# Patient Record
(demographics unavailable — no encounter records)

---

## 2024-10-31 NOTE — HISTORY OF PRESENT ILLNESS
[Other Location: e.g. School (Enter Location, City,State)___] : at [unfilled], at the time of the visit. [Medical Office: ([facility])___] : at the medical office located in  [Verbal consent obtained from patient] : the patient, [unfilled] [FreeTextEntry1] : Mr Panchito Ramirez is a 46-year-old  who presents for treatment of  low testosterone levels He had T levels assessed by PCP because of concerns about weight gain and lack of muscle mass despite regular exercise and healthy habits.         He denies significant sexual dysfunction or energy level issues, but notes some decrease in libido and longer time to achieve erection.     - Previous testosterone levels were 132 in February (afternoon reading) and 212 in July with a free testosterone of 3 (low).     - Denies significant medical problems, but reports an episode of shortness of breath about 1.5 years ago, which was evaluated and found to be due to a hiatal hernia.      - Denies smoking or excessive alcohol use.   - Past Medical History: Anxiety, hiatal hernia   - Past Surgical History: Shoulder, thumb, and finger surgeries   - Family History: No family history of prostate, kidney, or bladder cancer mentioned.   - Social History:     -  for 16 years     - Two children (son aged 16, daughter aged 13)     - Wife had tubal ligation after second child     -  by occupation     - Denies smoking or excessive alcohol use     - Exercises regularly (weightlifting 2-3 times per week)     - Follows a healthy diet     Objective:   - Diagnostic Results:     - Testosterone level: 132 (afternoon reading in February), 212 with free testosterone of 3 (low) in July     - Sex hormone binding globulin: 16.5 (normal)     - Luteinizing hormone: Normal     - Prolactin: 13.5 (normal)     - Previous cardiac evaluation (ultrasound, CT scan with contrast) showed no plaque and normal heart function   - Vital Signs: Not mentioned.   - Physical Examination (PE): Not discussed in detail, but provider plans to examine the patient.   Assessment:   - Summary: The patient is a 46-year-old male with documented low testosterone levels on two occasions, with associated symptoms of weight gain and difficulty gaining muscle mass despite regular exercise and healthy habits. The low testosterone levels appear to be idiopathic, as the hormones involved in testosterone production are normal.   - Problems:     - Low testosterone levels   - Differential Diagnosis:     - Idiopathic low testosterone levels     - Possible age-related decline in testosterone production   Plan:   - Summary: The plan is to obtain a repeat morning testosterone level and a bone density scan to further evaluate the need for testosterone replacement therapy. If the testosterone level is confirmed to be low and the patient is symptomatic, different options for testosterone replacement will be discussed, including potential risks and benefits. The provider will also consider the patient's insurance coverage and preferences in selecting the appropriate delivery method (e.g., gel, injections).   - Plan:     - Repeat morning testosterone level     - Obtain bone density scan     - Discuss options for testosterone replacement therapy if levels are low and patient is symptomatic, including potential risks (e.g., cardiovascular events, hair loss, breast tenderness, acne) and benefits     - Consider patient's insurance coverage and preferences in selecting the delivery method (e.g., gel, injections)   9.20.2024 returns on androgel 1.62 % one packet daily

## 2024-10-31 NOTE — ASSESSMENT
[FreeTextEntry1] :  The patient is a 46-year-old male with documented low testosterone levels on two occasions, with associated symptoms of weight gain and difficulty gaining muscle mass despite regular exercise and healthy habits. Gonadotropins were normal  We discussed T replacement therapy  We had an extensive discussion re Risks of T replacement; Patient was informed about Black box warning from FDA. We discussed recent data regarding increased risk of coronary plaque size, heart disease; thrombolic event; increased Hbg/Hct, breast tenderness; acne; irritability; sleep apnea and increased urinary symptoms; effect on testicular function including suppression of spermatogenesis; hair loss (male pattern baldness) effect on LFTS; effect on prostate cancer. Different treatment approaches were discussed including IM, transdermal and Testopel We discussed advantages and disadvantages of each We discussed risk to T exposure to partners and children from transdermal appoaches.  Patient choses to proceed with transdermal  The PEG MORRIS  expressed fully understanding of the information provided, the consequences and the management.  Plan: repeat T estradiol bone density if repeat T low will initiate androgel and repeat labs in 3 weeks  We discussed issues of insurance with regard to T replacement approach The PEG MORRIS  expressed fully understanding of the information provided, the consequences and the management.  9.20.2024 returns after one month of androgel improved thought, energy, sense of well being sexual function improved labs reviewed T low normal but had not applied x 24 hours will continue current regimen and asses in 6 weeks discussed timing of medication and testing The PEG MORRIS  expressed fully understanding of the information provided, the consequences and the management.  10.31.2024 continues to have benficail effects of T  no issues with application labs reviewed T levels not at desired result had not applied AM androgel discussed level without T being applied H/H increased over base line but in safe range will continue current and  repeat T  discussed achieved symtomatic goal Plan continue androgel 1% 25 mg Testosterone in am (2-3 hours) to assess peak and +- adjust does The PEG MORRIS  expressed fully understanding of the information provided, the consequences and the management.

## 2024-11-02 NOTE — ASSESSMENT
[FreeTextEntry1] :  The patient is a 46-year-old male with documented low testosterone levels on two occasions, with associated symptoms of weight gain and difficulty gaining muscle mass despite regular exercise and healthy habits. Gonadotropins were normal  We discussed T replacement therapy  We had an extensive discussion re Risks of T replacement; Patient was informed about Black box warning from FDA. We discussed recent data regarding increased risk of coronary plaque size, heart disease; thrombolic event; increased Hbg/Hct, breast tenderness; acne; irritability; sleep apnea and increased urinary symptoms; effect on testicular function including suppression of spermatogenesis; hair loss (male pattern baldness) effect on LFTS; effect on prostate cancer. Different treatment approaches were discussed including IM, transdermal and Testopel We discussed advantages and disadvantages of each We discussed risk to T exposure to partners and children from transdermal appoaches.  Patient choses to proceed with transdermal  The PEG MORRIS  expressed fully understanding of the information provided, the consequences and the management.  Plan: repeat T estradiol bone density if repeat T low will initiate androgel and repeat labs in 3 weeks  We discussed issues of insurance with regard to T replacement approach The PEG MORRIS  expressed fully understanding of the information provided, the consequences and the management.  9.20.2024 returns after one month of androgel improved thought, energy, sense of well being sexual function improved labs reviewed T low normal but had not applied x 24 hours will continue current regimen and asses in 6 weeks discussed timing of medication and testing The PEG MORRIS  expressed fully understanding of the information provided, the consequences and the management.

## 2024-11-02 NOTE — HISTORY OF PRESENT ILLNESS
[FreeTextEntry1] : Mr Panchito Ramirez is a 46-year-old  who presents for treatment of  low testosterone levels He had T levels assessed by PCP because of concerns about weight gain and lack of muscle mass despite regular exercise and healthy habits.         He denies significant sexual dysfunction or energy level issues, but notes some decrease in libido and longer time to achieve erection.     - Previous testosterone levels were 132 in February (afternoon reading) and 212 in July with a free testosterone of 3 (low).     - Denies significant medical problems, but reports an episode of shortness of breath about 1.5 years ago, which was evaluated and found to be due to a hiatal hernia.      - Denies smoking or excessive alcohol use.   - Past Medical History: Anxiety, hiatal hernia   - Past Surgical History: Shoulder, thumb, and finger surgeries   - Family History: No family history of prostate, kidney, or bladder cancer mentioned.   - Social History:     -  for 16 years     - Two children (son aged 16, daughter aged 13)     - Wife had tubal ligation after second child     -  by occupation     - Denies smoking or excessive alcohol use     - Exercises regularly (weightlifting 2-3 times per week)     - Follows a healthy diet     Objective:   - Diagnostic Results:     - Testosterone level: 132 (afternoon reading in February), 212 with free testosterone of 3 (low) in July     - Sex hormone binding globulin: 16.5 (normal)     - Luteinizing hormone: Normal     - Prolactin: 13.5 (normal)     - Previous cardiac evaluation (ultrasound, CT scan with contrast) showed no plaque and normal heart function   - Vital Signs: Not mentioned.   - Physical Examination (PE): Not discussed in detail, but provider plans to examine the patient.   Assessment:   - Summary: The patient is a 46-year-old male with documented low testosterone levels on two occasions, with associated symptoms of weight gain and difficulty gaining muscle mass despite regular exercise and healthy habits. The low testosterone levels appear to be idiopathic, as the hormones involved in testosterone production are normal.   - Problems:     - Low testosterone levels   - Differential Diagnosis:     - Idiopathic low testosterone levels     - Possible age-related decline in testosterone production   Plan:   - Summary: The plan is to obtain a repeat morning testosterone level and a bone density scan to further evaluate the need for testosterone replacement therapy. If the testosterone level is confirmed to be low and the patient is symptomatic, different options for testosterone replacement will be discussed, including potential risks and benefits. The provider will also consider the patient's insurance coverage and preferences in selecting the appropriate delivery method (e.g., gel, injections).   - Plan:     - Repeat morning testosterone level     - Obtain bone density scan     - Discuss options for testosterone replacement therapy if levels are low and patient is symptomatic, including potential risks (e.g., cardiovascular events, hair loss, breast tenderness, acne) and benefits     - Consider patient's insurance coverage and preferences in selecting the delivery method (e.g., gel, injections)   9.20.2024 returns on androgel 1.62 % one packet daily

## 2025-01-03 NOTE — HISTORY OF PRESENT ILLNESS
[Other Location: e.g. School (Enter Location, City,State)___] : at [unfilled], at the time of the visit. [Medical Office: ([facility])___] : at the medical office located in  [Verbal consent obtained from patient] : the patient, [unfilled] [FreeTextEntry1] : Mr Panchito Ramirez is a 46-year-old  who presents for treatment of  low testosterone levels He had T levels assessed by PCP because of concerns about weight gain and lack of muscle mass despite regular exercise and healthy habits.         He denies significant sexual dysfunction or energy level issues, but notes some decrease in libido and longer time to achieve erection.     - Previous testosterone levels were 132 in February (afternoon reading) and 212 in July with a free testosterone of 3 (low).     - Denies significant medical problems, but reports an episode of shortness of breath about 1.5 years ago, which was evaluated and found to be due to a hiatal hernia.      - Denies smoking or excessive alcohol use.   - Past Medical History: Anxiety, hiatal hernia   - Past Surgical History: Shoulder, thumb, and finger surgeries   - Family History: No family history of prostate, kidney, or bladder cancer mentioned.   - Social History:     -  for 16 years     - Two children (son aged 16, daughter aged 13)     - Wife had tubal ligation after second child     -  by occupation     - Denies smoking or excessive alcohol use     - Exercises regularly (weightlifting 2-3 times per week)     - Follows a healthy diet     Objective:   - Diagnostic Results:     - Testosterone level: 132 (afternoon reading in February), 212 with free testosterone of 3 (low) in July     - Sex hormone binding globulin: 16.5 (normal)     - Luteinizing hormone: Normal     - Prolactin: 13.5 (normal)     - Previous cardiac evaluation (ultrasound, CT scan with contrast) showed no plaque and normal heart function   - Vital Signs: Not mentioned.   - Physical Examination (PE): Not discussed in detail, but provider plans to examine the patient.   Assessment:   - Summary: The patient is a 46-year-old male with documented low testosterone levels on two occasions, with associated symptoms of weight gain and difficulty gaining muscle mass despite regular exercise and healthy habits. The low testosterone levels appear to be idiopathic, as the hormones involved in testosterone production are normal.   - Problems:     - Low testosterone levels   - Differential Diagnosis:     - Idiopathic low testosterone levels     - Possible age-related decline in testosterone production   Plan:   - Summary: The plan is to obtain a repeat morning testosterone level and a bone density scan to further evaluate the need for testosterone replacement therapy. If the testosterone level is confirmed to be low and the patient is symptomatic, different options for testosterone replacement will be discussed, including potential risks and benefits. The provider will also consider the patient's insurance coverage and preferences in selecting the appropriate delivery method (e.g., gel, injections).   - Plan:     - Repeat morning testosterone level     - Obtain bone density scan     - Discuss options for testosterone replacement therapy if levels are low and patient is symptomatic, including potential risks (e.g., cardiovascular events, hair loss, breast tenderness, acne) and benefits     - Consider patient's insurance coverage and preferences in selecting the delivery method (e.g., gel, injections)   9.20.2024 returns on androgel 1.62 % one packet daily  1.3.2024 returns on androgel  25mg/2.5 gm !% 2 packets daily

## 2025-01-03 NOTE — ASSESSMENT
[FreeTextEntry1] :  The patient is a 46-year-old male with documented low testosterone levels on two occasions, with associated symptoms of weight gain and difficulty gaining muscle mass despite regular exercise and healthy habits. Gonadotropins were normal  We discussed T replacement therapy  We had an extensive discussion re Risks of T replacement; Patient was informed about Black box warning from FDA. We discussed recent data regarding increased risk of coronary plaque size, heart disease; thrombolic event; increased Hbg/Hct, breast tenderness; acne; irritability; sleep apnea and increased urinary symptoms; effect on testicular function including suppression of spermatogenesis; hair loss (male pattern baldness) effect on LFTS; effect on prostate cancer. Different treatment approaches were discussed including IM, transdermal and Testopel We discussed advantages and disadvantages of each We discussed risk to T exposure to partners and children from transdermal appoaches.  Patient choses to proceed with transdermal  The PEG MORRIS  expressed fully understanding of the information provided, the consequences and the management.  Plan: repeat T estradiol bone density if repeat T low will initiate androgel and repeat labs in 3 weeks  We discussed issues of insurance with regard to T replacement approach The PEG MORRIS  expressed fully understanding of the information provided, the consequences and the management.  9.20.2024 returns after one month of androgel improved thought, energy, sense of well being sexual function improved labs reviewed T low normal but had not applied x 24 hours will continue current regimen and asses in 6 weeks discussed timing of medication and testing The PEG MORRIS  expressed fully understanding of the information provided, the consequences and the management.  1.3.2025 improved exercise tolerance enhanced muscle mass ? decreased libido and volume  anxiety levels improved patient notes mild depression; does not feel challenged ; no suicidal thoughts seeing a therapist  reviewed labs ( T improved over base line and one packet per day) discussed changing to testosterone 40.5 (1,62%) received 3 months of 1% T and therefore will continue and reassess The PEG MORRIS  expressed fully understanding of the information provided, the consequences and the management. will continue labs in 6 weeks

## 2025-03-11 NOTE — HEALTH RISK ASSESSMENT
[NO] : No [Audit-CScore] : 2 [[id number]] : 1 [High Risk Behavior] : no high risk behavior [Reports changes in hearing] : Reports no changes in hearing [Reports changes in vision] : Reports no changes in vision [Reports changes in dental health] : Reports no changes in dental health [TB Exposure] : is not being exposed to tuberculosis [ColonoscopyDate] : 11/2023 [AdvancecareDate] : 03/2025

## 2025-03-11 NOTE — HISTORY OF PRESENT ILLNESS
[FreeTextEntry1] : 47-year-old male, on treatment for anxiety/depression now returns for CPE. Over the past year, he denies that he has been hospitalized, has had surgery, or has been diagnosed with any new medical condition  [de-identified] :  was able to taper off of escitalopram successfully without recurrence of anxiety.  Remains on buspirone.  Now gives history of difficulty concentrating in context of family history of ADD diagnosed in his brother and and his daughter.  Wishes to discuss management and potential treatment. Requests updated referral for PMR for concern of triceps tendinitis. Followed by urology for hypogonadism, on testosterone replacement.  has been very aggressive regarding diet and exercise and has been able to accomplish a 6 pound weight loss in the past year.

## 2025-03-11 NOTE — HEALTH RISK ASSESSMENT
[NO] : No [Audit-CScore] : 2 [[id number]] : 1 [High Risk Behavior] : no high risk behavior [Reports changes in hearing] : Reports no changes in hearing [Reports changes in vision] : Reports no changes in vision [Reports changes in dental health] : Reports no changes in dental health [TB Exposure] : is not being exposed to tuberculosis [ColonoscopyDate] : 11/2023 [AdvancecareDate] : 03/2025

## 2025-03-11 NOTE — ASSESSMENT
[FreeTextEntry1] : Health Maintenance His current weight and BMI are acceptable and no additional weight loss is currently recommended However any weight gain should try to be avoided. Daily aerobic exercises strongly recommended. No STD risk or substance abuse per patient report. Occasional gender specific self-examination is suggested. No depression. Competent with ADLs. Serial colonoscopy recommended in 4 years. Has completed COVID-vaccine series with both monovalent and polyvalent boosters as well as updated variant specific vaccines. Also received flu vaccine earlier this year at pharmacy.  HTN BP taken x 2 in office today. 135/70..  125/73. Excellent control on current regimen (valsartan 80 mg).  Continue compliance.  No changes necessary at this time Prescription renewed for 1 year.  Anxiety/Attention Deficit Brief psychological counseling was provided.  Patient is highly intelligent, is not overwhelmed, and appears to have appropriate understanding of underlying issues and treatment options for his anxiety.  He denies suicidal ideation or intention for self-harm. Successfully tapered off of escitalopram.  Symptoms remain stable on buspirone 7.5 mg by itself. Patient does not feel that any further evaluation is necessary at this time.  Hiatal hernia with esophagitis  REVIEWED EGD pathology report dated 11/20 which shows moderate, focally active, chronic inflammation in the lower esophagus. Explained that this finding is consistent with "pre-Soria's esophagitis "and that treatment should be considered in order to prevent future progression to actual Soria's esophagitis even without symptomatology. Patient states he has been compliant with famotidine 40 mg.  Prescription renewed for 1 year.  Would consider GI follow-up with possible upper endoscopy next year.  ? Triceps tendinitis At patient request, referral provided for follow-up with PMR (Dr. Siddiqui).).

## 2025-03-11 NOTE — HISTORY OF PRESENT ILLNESS
[FreeTextEntry1] : 47-year-old male, on treatment for anxiety/depression now returns for CPE. Over the past year, he denies that he has been hospitalized, has had surgery, or has been diagnosed with any new medical condition  [de-identified] :  was able to taper off of escitalopram successfully without recurrence of anxiety.  Remains on buspirone.  Now gives history of difficulty concentrating in context of family history of ADD diagnosed in his brother and and his daughter.  Wishes to discuss management and potential treatment. Requests updated referral for PMR for concern of triceps tendinitis. Followed by urology for hypogonadism, on testosterone replacement.  has been very aggressive regarding diet and exercise and has been able to accomplish a 6 pound weight loss in the past year.

## 2025-03-20 NOTE — ASSESSMENT
[FreeTextEntry1] :  The patient is a 46-year-old male with documented low testosterone levels on two occasions, with associated symptoms of weight gain and difficulty gaining muscle mass despite regular exercise and healthy habits. Gonadotropins were normal  We discussed T replacement therapy  We had an extensive discussion re Risks of T replacement; Patient was informed about Black box warning from FDA. We discussed recent data regarding increased risk of coronary plaque size, heart disease; thrombolic event; increased Hbg/Hct, breast tenderness; acne; irritability; sleep apnea and increased urinary symptoms; effect on testicular function including suppression of spermatogenesis; hair loss (male pattern baldness) effect on LFTS; effect on prostate cancer. Different treatment approaches were discussed including IM, transdermal and Testopel We discussed advantages and disadvantages of each We discussed risk to T exposure to partners and children from transdermal appoaches.  Patient choses to proceed with transdermal  The PEG MORRIS  expressed fully understanding of the information provided, the consequences and the management.  Plan: repeat T estradiol bone density if repeat T low will initiate androgel and repeat labs in 3 weeks  We discussed issues of insurance with regard to T replacement approach The PEG MORRIS  expressed fully understanding of the information provided, the consequences and the management.  9.20.2024 returns after one month of androgel improved thought, energy, sense of well being sexual function improved labs reviewed T low normal but had not applied x 24 hours will continue current regimen and asses in 6 weeks discussed timing of medication and testing The PEG MORRIS  expressed fully understanding of the information provided, the consequences and the management.  1.3.2025 improved exercise tolerance enhanced muscle mass ? decreased libido and volume  anxiety levels improved patient notes mild depression; does not feel challenged ; no suicidal thoughts seeing a therapist  reviewed labs ( T improved over base line and one packet per day) discussed changing to testosterone 40.5 (1,62%) received 3 months of 1% T and therefore will continue and reassess The PEG MORRIS  expressed fully understanding of the information provided, the consequences and the management. will continue labs in 6 weeks  3.20.2025 continues on one packet of !% one packet was unable to get 1.62% T was 387 on one packet when on 2 packet 223? patient on his own went back to one packet patient under significant stress; testifying in court complaining of loss of libido discussed attempting Testosterone cypionate  Plan testosterone 100 mg ( 60 mg) patient to get T cypionates and bring to office for teaching and administration

## 2025-03-20 NOTE — HISTORY OF PRESENT ILLNESS
[FreeTextEntry1] : Mr Panchito Ramirez is a 46-year-old  who presents for treatment of  low testosterone levels He had T levels assessed by PCP because of concerns about weight gain and lack of muscle mass despite regular exercise and healthy habits.         He denies significant sexual dysfunction or energy level issues, but notes some decrease in libido and longer time to achieve erection.     - Previous testosterone levels were 132 in February (afternoon reading) and 212 in July with a free testosterone of 3 (low).     - Denies significant medical problems, but reports an episode of shortness of breath about 1.5 years ago, which was evaluated and found to be due to a hiatal hernia.      - Denies smoking or excessive alcohol use.   - Past Medical History: Anxiety, hiatal hernia   - Past Surgical History: Shoulder, thumb, and finger surgeries   - Family History: No family history of prostate, kidney, or bladder cancer mentioned.   - Social History:     -  for 16 years     - Two children (son aged 16, daughter aged 13)     - Wife had tubal ligation after second child     -  by occupation     - Denies smoking or excessive alcohol use     - Exercises regularly (weightlifting 2-3 times per week)     - Follows a healthy diet     Objective:   - Diagnostic Results:     - Testosterone level: 132 (afternoon reading in February), 212 with free testosterone of 3 (low) in July     - Sex hormone binding globulin: 16.5 (normal)     - Luteinizing hormone: Normal     - Prolactin: 13.5 (normal)     - Previous cardiac evaluation (ultrasound, CT scan with contrast) showed no plaque and normal heart function   - Vital Signs: Not mentioned.   - Physical Examination (PE): Not discussed in detail, but provider plans to examine the patient.   Assessment:   - Summary: The patient is a 46-year-old male with documented low testosterone levels on two occasions, with associated symptoms of weight gain and difficulty gaining muscle mass despite regular exercise and healthy habits. The low testosterone levels appear to be idiopathic, as the hormones involved in testosterone production are normal.   - Problems:     - Low testosterone levels   - Differential Diagnosis:     - Idiopathic low testosterone levels     - Possible age-related decline in testosterone production   Plan:   - Summary: The plan is to obtain a repeat morning testosterone level and a bone density scan to further evaluate the need for testosterone replacement therapy. If the testosterone level is confirmed to be low and the patient is symptomatic, different options for testosterone replacement will be discussed, including potential risks and benefits. The provider will also consider the patient's insurance coverage and preferences in selecting the appropriate delivery method (e.g., gel, injections).   - Plan:     - Repeat morning testosterone level     - Obtain bone density scan     - Discuss options for testosterone replacement therapy if levels are low and patient is symptomatic, including potential risks (e.g., cardiovascular events, hair loss, breast tenderness, acne) and benefits     - Consider patient's insurance coverage and preferences in selecting the delivery method (e.g., gel, injections)   9.20.2024 returns on androgel 1.62 % one packet daily  1.3.2024 returns on androgel  25mg/2.5 gm !% 2 packets daily   3.20.2025 returns on testosterone 1%  continues to complain of loss of libido

## 2025-03-20 NOTE — REASON FOR VISIT
[Other Location: e.g. School (Enter Location, City,State)___] : at [unfilled], at the time of the visit. [Medical Office: ([facility])___] : at the medical office located in  [Verbal consent obtained from patient] : the patient, [unfilled]